# Patient Record
Sex: MALE | Race: WHITE
[De-identification: names, ages, dates, MRNs, and addresses within clinical notes are randomized per-mention and may not be internally consistent; named-entity substitution may affect disease eponyms.]

---

## 2021-04-12 DIAGNOSIS — Z78.9 OTHER SPECIFIED HEALTH STATUS: ICD-10-CM

## 2021-04-12 DIAGNOSIS — Z12.5 ENCOUNTER FOR SCREENING FOR MALIGNANT NEOPLASM OF PROSTATE: ICD-10-CM

## 2021-04-12 DIAGNOSIS — Z86.79 PERSONAL HISTORY OF OTHER DISEASES OF THE CIRCULATORY SYSTEM: ICD-10-CM

## 2021-04-12 DIAGNOSIS — Z82.49 FAMILY HISTORY OF ISCHEMIC HEART DISEASE AND OTHER DISEASES OF THE CIRCULATORY SYSTEM: ICD-10-CM

## 2021-04-12 DIAGNOSIS — Z79.01 LONG TERM (CURRENT) USE OF ANTICOAGULANTS: ICD-10-CM

## 2021-04-12 DIAGNOSIS — Z86.39 PERSONAL HISTORY OF OTHER ENDOCRINE, NUTRITIONAL AND METABOLIC DISEASE: ICD-10-CM

## 2021-04-12 RX ORDER — ASPIRIN 81 MG
81 TABLET, DELAYED RELEASE (ENTERIC COATED) ORAL
Refills: 0 | Status: COMPLETED | COMMUNITY

## 2021-04-12 RX ORDER — ASPIRIN ENTERIC COATED TABLETS 81 MG 81 MG/1
81 TABLET, DELAYED RELEASE ORAL
Refills: 0 | Status: ACTIVE | COMMUNITY

## 2021-04-29 ENCOUNTER — NON-APPOINTMENT (OUTPATIENT)
Age: 66
End: 2021-04-29

## 2021-04-29 ENCOUNTER — APPOINTMENT (OUTPATIENT)
Dept: HEART AND VASCULAR | Facility: CLINIC | Age: 66
End: 2021-04-29
Payer: COMMERCIAL

## 2021-04-29 VITALS
HEART RATE: 73 BPM | DIASTOLIC BLOOD PRESSURE: 74 MMHG | BODY MASS INDEX: 24.5 KG/M2 | HEIGHT: 71 IN | SYSTOLIC BLOOD PRESSURE: 114 MMHG | TEMPERATURE: 97.5 F | WEIGHT: 175 LBS

## 2021-04-29 PROCEDURE — 36415 COLL VENOUS BLD VENIPUNCTURE: CPT

## 2021-04-29 PROCEDURE — 99214 OFFICE O/P EST MOD 30 MIN: CPT | Mod: 25

## 2021-04-29 PROCEDURE — 99072 ADDL SUPL MATRL&STAF TM PHE: CPT

## 2021-04-29 PROCEDURE — 93000 ELECTROCARDIOGRAM COMPLETE: CPT

## 2021-04-29 NOTE — ASSESSMENT
[FreeTextEntry1] : Patient doing well will follow up in 3 months for echo to evaluate MR and stress testing to evaluate his arrhythmias as well as his CAD patient also has a borderline elevated PSA will check and make sure he is following up with urology

## 2021-04-29 NOTE — HISTORY OF PRESENT ILLNESS
[FreeTextEntry1] : Patient has a history of coronary artery disease with a stent placement patient has a history of atrial fibrillation status post ablation patient has a permanent pacemaker patient is doing well

## 2021-05-12 LAB
ALBUMIN SERPL ELPH-MCNC: 4.3 G/DL
ALP BLD-CCNC: 73 U/L
ALT SERPL-CCNC: 29 U/L
ANION GAP SERPL CALC-SCNC: 12 MMOL/L
AST SERPL-CCNC: 37 U/L
BASOPHILS # BLD AUTO: 0.02 K/UL
BASOPHILS NFR BLD AUTO: 0.4 %
BILIRUB SERPL-MCNC: 0.7 MG/DL
BUN SERPL-MCNC: 30 MG/DL
CALCIUM SERPL-MCNC: 9.3 MG/DL
CHLORIDE SERPL-SCNC: 105 MMOL/L
CHOLEST SERPL-MCNC: 159 MG/DL
CK SERPL-CCNC: 376 U/L
CO2 SERPL-SCNC: 24 MMOL/L
CREAT SERPL-MCNC: 1.25 MG/DL
EOSINOPHIL # BLD AUTO: 0.09 K/UL
EOSINOPHIL NFR BLD AUTO: 1.9 %
ESTIMATED AVERAGE GLUCOSE: 117 MG/DL
FERRITIN SERPL-MCNC: 194 NG/ML
GLUCOSE SERPL-MCNC: 92 MG/DL
HBA1C MFR BLD HPLC: 5.7 %
HCT VFR BLD CALC: 44.7 %
HDLC SERPL-MCNC: 75 MG/DL
HGB BLD-MCNC: 15.2 G/DL
IMM GRANULOCYTES NFR BLD AUTO: 0.2 %
LDLC SERPL CALC-MCNC: 69 MG/DL
LYMPHOCYTES # BLD AUTO: 1.4 K/UL
LYMPHOCYTES NFR BLD AUTO: 29.1 %
MAN DIFF?: NORMAL
MCHC RBC-ENTMCNC: 32.2 PG
MCHC RBC-ENTMCNC: 34 GM/DL
MCV RBC AUTO: 94.7 FL
MONOCYTES # BLD AUTO: 0.45 K/UL
MONOCYTES NFR BLD AUTO: 9.4 %
NEUTROPHILS # BLD AUTO: 2.84 K/UL
NEUTROPHILS NFR BLD AUTO: 59 %
NONHDLC SERPL-MCNC: 83 MG/DL
PLATELET # BLD AUTO: 190 K/UL
POTASSIUM SERPL-SCNC: 4.8 MMOL/L
PROT SERPL-MCNC: 6.6 G/DL
PSA SERPL-MCNC: 2.52 NG/ML
RBC # BLD: 4.72 M/UL
RBC # FLD: 13.1 %
SODIUM SERPL-SCNC: 141 MMOL/L
TRIGL SERPL-MCNC: 74 MG/DL
TSH SERPL-ACNC: 0.93 UIU/ML
VIT B12 SERPL-MCNC: 1455 PG/ML
WBC # FLD AUTO: 4.81 K/UL

## 2022-05-03 ENCOUNTER — APPOINTMENT (OUTPATIENT)
Dept: HEART AND VASCULAR | Facility: CLINIC | Age: 67
End: 2022-05-03
Payer: MEDICARE

## 2022-05-03 ENCOUNTER — NON-APPOINTMENT (OUTPATIENT)
Age: 67
End: 2022-05-03

## 2022-05-03 VITALS — SYSTOLIC BLOOD PRESSURE: 116 MMHG | DIASTOLIC BLOOD PRESSURE: 77 MMHG

## 2022-05-03 VITALS
OXYGEN SATURATION: 95 % | SYSTOLIC BLOOD PRESSURE: 143 MMHG | DIASTOLIC BLOOD PRESSURE: 88 MMHG | HEART RATE: 78 BPM | TEMPERATURE: 97.2 F | WEIGHT: 173 LBS | BODY MASS INDEX: 24.22 KG/M2 | HEIGHT: 71 IN

## 2022-05-03 PROCEDURE — 93000 ELECTROCARDIOGRAM COMPLETE: CPT

## 2022-05-03 PROCEDURE — 99214 OFFICE O/P EST MOD 30 MIN: CPT | Mod: 25

## 2022-05-03 NOTE — ASSESSMENT
[FreeTextEntry1] : Patient doing well will follow up with for echo to evaluate MR and stress testing to evaluate his arrhythmias as well as his CAD patient also has a borderline elevated PSA will check and make sure he is following up with urology

## 2022-05-03 NOTE — HISTORY OF PRESENT ILLNESS
[FreeTextEntry1] : Patient has a history of coronary artery disease with a stent placement patient has a history of atrial fibrillation status post ablation patient has a permanent pacemaker patient is doing well pt has stents in lad and rca july 19,2004 pt has moderate mr

## 2022-06-09 ENCOUNTER — APPOINTMENT (OUTPATIENT)
Dept: HEART AND VASCULAR | Facility: CLINIC | Age: 67
End: 2022-06-09
Payer: MEDICARE

## 2022-06-09 VITALS — HEART RATE: 75 BPM | DIASTOLIC BLOOD PRESSURE: 78 MMHG | SYSTOLIC BLOOD PRESSURE: 118 MMHG

## 2022-06-09 PROCEDURE — 93306 TTE W/DOPPLER COMPLETE: CPT

## 2022-06-09 PROCEDURE — 99212 OFFICE O/P EST SF 10 MIN: CPT | Mod: 25

## 2022-06-09 PROCEDURE — 93015 CV STRESS TEST SUPVJ I&R: CPT

## 2022-06-12 NOTE — REASON FOR VISIT
[Arrhythmia/ECG Abnorrmalities] : arrhythmia/ECG abnormalities [Hyperlipidemia] : hyperlipidemia [Hypertension] : hypertension [FreeTextEntry1] : pt takes toprol xl 50/25 am/pm pt s/p stents to lad and rca in 2004 s/p ablation for paf and ppm  2005 for 5 second pause s/p paf an d pvi 2007

## 2022-06-12 NOTE — REVIEW OF SYSTEMS
[Dyspnea on exertion] : dyspnea during exertion [Palpitations] : palpitations [Negative] : Integumentary

## 2022-06-12 NOTE — HISTORY OF PRESENT ILLNESS
[FreeTextEntry1] : Patient with history of stents to the LAD and RCA patient with history of paroxysmal A. fib status post multiple cardioversions and ablation patient with history of mitral regurgitation due to mitral valve prolapse and probably the etiology of his atrial fibrillation patient has been less active recently and has had some shortness of breath with exercise

## 2022-06-12 NOTE — ASSESSMENT
[FreeTextEntry1] : Patient underwent exercise stress testing patient does have mild ST depressions at peak exercise that may represent his ischemic heart disease but due to achieving greater than 10 METS we will continue medical therapy\par \par Patient's mitral regurgitation is moderate and also may be contributing to shortness of breath at peak exercise we will continue medical management

## 2022-12-15 ENCOUNTER — APPOINTMENT (OUTPATIENT)
Dept: HEART AND VASCULAR | Facility: CLINIC | Age: 67
End: 2022-12-15

## 2023-02-23 ENCOUNTER — NON-APPOINTMENT (OUTPATIENT)
Age: 68
End: 2023-02-23

## 2023-02-23 ENCOUNTER — APPOINTMENT (OUTPATIENT)
Dept: HEART AND VASCULAR | Facility: CLINIC | Age: 68
End: 2023-02-23
Payer: MEDICARE

## 2023-02-23 VITALS
HEART RATE: 77 BPM | OXYGEN SATURATION: 98 % | DIASTOLIC BLOOD PRESSURE: 78 MMHG | SYSTOLIC BLOOD PRESSURE: 118 MMHG | WEIGHT: 177 LBS | BODY MASS INDEX: 24.78 KG/M2 | HEIGHT: 71 IN

## 2023-02-23 PROCEDURE — 93000 ELECTROCARDIOGRAM COMPLETE: CPT

## 2023-02-23 PROCEDURE — 99214 OFFICE O/P EST MOD 30 MIN: CPT

## 2023-02-23 NOTE — ASSESSMENT
[FreeTextEntry1] : pt with cad s/p stents schedule ett\par labile hypertension increased metoprolol succinate 50mg po bid\par paf in nsr s/p ablation has ppm\par elevated psa 4.4 seeing urology\par colonscopy 2023\par pt followed by internist

## 2023-02-23 NOTE — REASON FOR VISIT
[Arrhythmia/ECG Abnorrmalities] : arrhythmia/ECG abnormalities [Hyperlipidemia] : hyperlipidemia [Hypertension] : hypertension [FreeTextEntry1] : pt takes toprol xl 50/25 am/pm pt s/p stents to lad and rca in 2004 s/p ablation for paf and ppm  2005 for 5 second pause s/p paf an d pvi 2007 pt with labille hypertension 165 and 117

## 2023-03-07 ENCOUNTER — APPOINTMENT (OUTPATIENT)
Dept: HEART AND VASCULAR | Facility: CLINIC | Age: 68
End: 2023-03-07
Payer: MEDICARE

## 2023-03-07 PROCEDURE — 99214 OFFICE O/P EST MOD 30 MIN: CPT | Mod: 25

## 2023-03-07 PROCEDURE — 93015 CV STRESS TEST SUPVJ I&R: CPT

## 2023-03-07 NOTE — REASON FOR VISIT
[Arrhythmia/ECG Abnorrmalities] : arrhythmia/ECG abnormalities [Hyperlipidemia] : hyperlipidemia [Hypertension] : hypertension [FreeTextEntry1] : Pt. is a 68 year old M with PMHx of Afib with RVR (s/p ablation), mitral valve prolapse and CAD (s/p PCI to LAD and RCA 19 years ago ) who presents today for follow-up and stress EKG.

## 2023-03-07 NOTE — HISTORY OF PRESENT ILLNESS
[FreeTextEntry1] : Pt. was last seen 2023. At that time his Metoprolol was increased to 50 mg BID due to labile HTN. \par \par Since then: \par \par HLD: On atorvastatin 40 mg daily. Last LDL 91 above goal LDL < 70 \par \par HTN: On Metoprolol succinate 50 mg BID\par \par CAD: on ASA 81 mg daily \par \par ========================\par Labs/Diagnostics: \par Lipid profile (2023): TC: 177, HDL: 66, T, LDL: 91\par \par Echo (2022): normal LVSF with EF 65%, mild MR\par \par Stress EKG (2022): Equivocal stress test; exaggerated BP response to exercise; pt. exercised according to the CONNER protocol for 12:01 minutes, achieving 13.70 METS

## 2023-03-07 NOTE — ASSESSMENT
[FreeTextEntry1] : Pt. is a 68 year old M with PMHx of Afib with RVR (s/p ablation), mitral valve prolapse and CAD (s/p PCI to LAD and RCA) who presents today for follow-up and stress EKG.\par Patient exercised for 11 minutes upsloping EKG changes no chest pain no shortness of breath continue medical management patient had stents to the LAD and RCA and 19 years ago\par pt had mild hypertensive response pt on metopolol er 50mg bid

## 2023-05-03 ENCOUNTER — RX RENEWAL (OUTPATIENT)
Age: 68
End: 2023-05-03

## 2023-06-03 ENCOUNTER — RX RENEWAL (OUTPATIENT)
Age: 68
End: 2023-06-03

## 2023-08-10 ENCOUNTER — RX RENEWAL (OUTPATIENT)
Age: 68
End: 2023-08-10

## 2023-09-14 ENCOUNTER — APPOINTMENT (OUTPATIENT)
Dept: HEART AND VASCULAR | Facility: CLINIC | Age: 68
End: 2023-09-14
Payer: MEDICARE

## 2023-09-14 VITALS
HEIGHT: 71 IN | SYSTOLIC BLOOD PRESSURE: 130 MMHG | WEIGHT: 169 LBS | BODY MASS INDEX: 23.66 KG/M2 | HEART RATE: 83 BPM | DIASTOLIC BLOOD PRESSURE: 82 MMHG

## 2023-09-14 DIAGNOSIS — I34.1 NONRHEUMATIC MITRAL (VALVE) PROLAPSE: ICD-10-CM

## 2023-09-14 DIAGNOSIS — Z00.00 ENCOUNTER FOR GENERAL ADULT MEDICAL EXAMINATION W/OUT ABNORMAL FINDINGS: ICD-10-CM

## 2023-09-14 DIAGNOSIS — I10 ESSENTIAL (PRIMARY) HYPERTENSION: ICD-10-CM

## 2023-09-14 DIAGNOSIS — I25.10 ATHEROSCLEROTIC HEART DISEASE OF NATIVE CORONARY ARTERY W/OUT ANGINA PECTORIS: ICD-10-CM

## 2023-09-14 PROCEDURE — 36415 COLL VENOUS BLD VENIPUNCTURE: CPT

## 2023-09-14 PROCEDURE — 99214 OFFICE O/P EST MOD 30 MIN: CPT

## 2023-09-14 PROCEDURE — 93000 ELECTROCARDIOGRAM COMPLETE: CPT

## 2023-09-14 RX ORDER — METOPROLOL SUCCINATE 50 MG/1
50 TABLET, EXTENDED RELEASE ORAL TWICE DAILY
Qty: 180 | Refills: 0 | Status: DISCONTINUED | COMMUNITY
Start: 2023-03-07 | End: 2023-09-14

## 2023-09-15 LAB
ALBUMIN SERPL ELPH-MCNC: 4.5 G/DL
ALP BLD-CCNC: 86 U/L
ALT SERPL-CCNC: 19 U/L
ANION GAP SERPL CALC-SCNC: 12 MMOL/L
AST SERPL-CCNC: 22 U/L
BILIRUB SERPL-MCNC: 0.7 MG/DL
BUN SERPL-MCNC: 27 MG/DL
CALCIUM SERPL-MCNC: 9.4 MG/DL
CHLORIDE SERPL-SCNC: 103 MMOL/L
CHOLEST SERPL-MCNC: 182 MG/DL
CK SERPL-CCNC: 126 U/L
CO2 SERPL-SCNC: 23 MMOL/L
CREAT SERPL-MCNC: 1.13 MG/DL
EGFR: 70 ML/MIN/1.73M2
ESTIMATED AVERAGE GLUCOSE: 117 MG/DL
FERRITIN SERPL-MCNC: 152 NG/ML
GLUCOSE SERPL-MCNC: 101 MG/DL
HBA1C MFR BLD HPLC: 5.7 %
HDLC SERPL-MCNC: 67 MG/DL
LDLC SERPL CALC-MCNC: 97 MG/DL
NONHDLC SERPL-MCNC: 115 MG/DL
POTASSIUM SERPL-SCNC: 4.5 MMOL/L
PROT SERPL-MCNC: 6.7 G/DL
SODIUM SERPL-SCNC: 139 MMOL/L
TRIGL SERPL-MCNC: 102 MG/DL
TSH SERPL-ACNC: 1.09 UIU/ML
VIT B12 SERPL-MCNC: 1420 PG/ML

## 2023-11-14 ENCOUNTER — RX RENEWAL (OUTPATIENT)
Age: 68
End: 2023-11-14

## 2023-11-14 RX ORDER — METOPROLOL SUCCINATE 25 MG/1
25 TABLET, EXTENDED RELEASE ORAL
Qty: 90 | Refills: 1 | Status: ACTIVE | COMMUNITY
Start: 1900-01-01 | End: 1900-01-01

## 2023-12-08 ENCOUNTER — RX RENEWAL (OUTPATIENT)
Age: 68
End: 2023-12-08

## 2024-03-07 ENCOUNTER — RX RENEWAL (OUTPATIENT)
Age: 69
End: 2024-03-07

## 2024-03-14 ENCOUNTER — RX RENEWAL (OUTPATIENT)
Age: 69
End: 2024-03-14

## 2024-03-14 RX ORDER — ATORVASTATIN CALCIUM 80 MG/1
80 TABLET, FILM COATED ORAL
Qty: 90 | Refills: 1 | Status: ACTIVE | COMMUNITY
Start: 2023-05-03 | End: 1900-01-01

## 2024-04-25 ENCOUNTER — APPOINTMENT (OUTPATIENT)
Dept: HEART AND VASCULAR | Facility: CLINIC | Age: 69
End: 2024-04-25
Payer: MEDICARE

## 2024-04-25 VITALS
OXYGEN SATURATION: 95 % | HEART RATE: 80 BPM | BODY MASS INDEX: 24.22 KG/M2 | DIASTOLIC BLOOD PRESSURE: 78 MMHG | WEIGHT: 173 LBS | SYSTOLIC BLOOD PRESSURE: 122 MMHG | HEIGHT: 71 IN | TEMPERATURE: 93.4 F

## 2024-04-25 VITALS — SYSTOLIC BLOOD PRESSURE: 108 MMHG | DIASTOLIC BLOOD PRESSURE: 76 MMHG | HEART RATE: 71 BPM

## 2024-04-25 PROCEDURE — 93015 CV STRESS TEST SUPVJ I&R: CPT

## 2024-04-25 PROCEDURE — ZZZZZ: CPT

## 2024-04-25 PROCEDURE — 93306 TTE W/DOPPLER COMPLETE: CPT

## 2024-04-25 PROCEDURE — 36415 COLL VENOUS BLD VENIPUNCTURE: CPT

## 2024-04-25 PROCEDURE — 99214 OFFICE O/P EST MOD 30 MIN: CPT | Mod: 25

## 2024-04-25 PROCEDURE — ZZZZZ: CPT | Mod: NC

## 2024-04-25 NOTE — HISTORY OF PRESENT ILLNESS
[FreeTextEntry1] : Since his last visit, Pt is doing well  [Prior Hx 2023: He is wondering if we can decrease his Metoprolol so he can feel an increased HR response to exercise. His is currently taking Metoprolol succinate 50 mg BID.   He is not regularly monitoring his BP at home.   Patient remains active walking about 10,000-15,000 steps/day. He also runs maybe twice/week. No exertional chest pain or shortness of breath.  Pt. also denies any orthopnea, PND, palpitations, lightheadedness, syncope or lower extremity edema.   He lost 10 lbs. in the last 6 months by limiting carbs and intermittent fasting.   EP: Dr. Mohr PCP: Cyn Mcgovern (last visit 23)]  ==================================== Labs/Diagnostics:  2023 A1c: 5.7% Lipid profile: T, TC: 182, HDL: 67, LDL: 97 K/Creat: 4.5/1.13  Stress EKG (2023): pt. exercised according to the CONNER protocol for 11:04 minutes, achieving 13.40 METS; no ischemic ST changes on EKG.   Echo (2022): normal mitral valve, normal LVSF with EF 65%, normal RV size and function

## 2024-04-25 NOTE — ASSESSMENT
[FreeTextEntry1] : Pt. is a 69-year-old M with PMHx of Afib with RVR (s/p ablation), PPM placement (due to pause), mitral valve prolapse and CAD (s/p PCI to LAD and RCA 19 years ago) who presents today for follow-up. paf s/p ablations innsr ppm for pauses cad negative ett for ischemia excellent ex tolerance

## 2024-04-25 NOTE — REASON FOR VISIT
[Arrhythmia/ECG Abnorrmalities] : arrhythmia/ECG abnormalities [Structural Heart and Valve Disease] : structural heart and valve disease [Coronary Artery Disease] : coronary artery disease [FreeTextEntry1] : Pt. is a 69-year-old M with PMHx of Afib with RVR (s/p ablation), PPM placement (due to pause), mitral valve prolapse and CAD (s/p PCI to LAD and RCA 19 years ago) who presents today for follow-up.

## 2024-05-30 ENCOUNTER — RX RENEWAL (OUTPATIENT)
Age: 69
End: 2024-05-30

## 2024-05-30 RX ORDER — LOSARTAN POTASSIUM 25 MG/1
25 TABLET, FILM COATED ORAL
Qty: 90 | Refills: 0 | Status: ACTIVE | COMMUNITY
Start: 2023-09-14 | End: 1900-01-01

## 2024-06-12 ENCOUNTER — APPOINTMENT (OUTPATIENT)
Dept: HEART AND VASCULAR | Facility: CLINIC | Age: 69
End: 2024-06-12
Payer: MEDICARE

## 2024-06-12 VITALS
WEIGHT: 173 LBS | HEART RATE: 75 BPM | SYSTOLIC BLOOD PRESSURE: 103 MMHG | DIASTOLIC BLOOD PRESSURE: 76 MMHG | BODY MASS INDEX: 24.22 KG/M2 | OXYGEN SATURATION: 95 % | TEMPERATURE: 95.9 F | HEIGHT: 71 IN

## 2024-06-12 DIAGNOSIS — I48.91 UNSPECIFIED ATRIAL FIBRILLATION: ICD-10-CM

## 2024-06-12 DIAGNOSIS — Z95.0 PRESENCE OF CARDIAC PACEMAKER: ICD-10-CM

## 2024-06-12 PROCEDURE — 99213 OFFICE O/P EST LOW 20 MIN: CPT | Mod: 25

## 2024-06-12 PROCEDURE — 93280 PM DEVICE PROGR EVAL DUAL: CPT

## 2024-06-25 PROBLEM — I48.91 ATRIAL FIBRILLATION WITH RVR: Status: ACTIVE | Noted: 2021-04-29

## 2024-06-25 PROBLEM — Z95.0 ARTIFICIAL CARDIAC PACEMAKER: Status: ACTIVE | Noted: 2024-06-25

## 2024-06-25 NOTE — REASON FOR VISIT
[Symptom and Test Evaluation] : symptom and test evaluation [Arrhythmia/ECG Abnorrmalities] : arrhythmia/ECG abnormalities [FreeTextEntry1] : Pt. is a 69-year-old M with PMHx of Afib with RVR (s/p ablation), PPM placement (due to pause), mitral valve prolapse and CAD (s/p PCI to LAD and RCA 19 years ago) who presents today for follow-up. [Follow-up Device Check] : is here today for a follow-up device check visit for [New Patient Device Check] : is here today for a new patient device check visit for [Other ___] : [unfilled]

## 2024-06-26 NOTE — PROCEDURE
[No] : not [NSR] : normal sinus rhythm [Pacemaker] : pacemaker [Medtronic] : Medtronic [DDDR] : DDDR [Threshold Testing Performed] : Threshold testing was performed [Lead Imp:  ___ohms] : lead impedance was [unfilled] ohms [Sensing Amplitude ___mv] : sensing amplitude was [unfilled] mv [___V @] : [unfilled] V [___ ms] : [unfilled] ms [Counters Reset] : the counters were reset [Longevity: ___ months] : The estimated remaining battery life is [unfilled] months [de-identified] : Underlying SR 60s [de-identified] : [place]ett [de-identified] :  [de-identified] :  13 months [de-identified] : AP 85%  0% No AF

## 2024-06-26 NOTE — END OF VISIT
[Time Spent: ___ minutes] : I have spent [unfilled] minutes of time on the encounter. [FreeTextEntry3] : I, Cyndy Singh, am scribing for (in the presence of) Dr. Lopez the following sections: HPI, PMH,Family/social history, ROS, Physical Exam, Assessment / Plan.   I, Mansoor Lopez, personally performed the services described in the documentation, reviewed the documentation recorded by the scribe in my presence and it accurately and completely records my words and actions.

## 2024-06-26 NOTE — HISTORY OF PRESENT ILLNESS
[de-identified] : Pt. is a 69-year-old M with PMHx of Afib with RVR (s/p 2 ablations, most recenlty 15 years ago), PPM placement (due to pause), mitral valve prolapse and CAD (s/p PCI to LAD and RCA 19 years ago) who presents today to establish care as his EP recently retired.   He offers no device related complaints. He is considering where to transfer his EP care and would not like to transfer remote monitoring at this time.

## 2024-06-26 NOTE — HISTORY OF PRESENT ILLNESS
[de-identified] : Pt. is a 69-year-old M with PMHx of Afib with RVR (s/p 2 ablations, most recenlty 15 years ago), PPM placement (due to pause), mitral valve prolapse and CAD (s/p PCI to LAD and RCA 19 years ago) who presents today to establish care as his EP recently retired.   He offers no device related complaints. He is considering where to transfer his EP care and would not like to transfer remote monitoring at this time.

## 2024-06-26 NOTE — PROCEDURE
[No] : not [NSR] : normal sinus rhythm [Pacemaker] : pacemaker [Medtronic] : Medtronic [DDDR] : DDDR [Threshold Testing Performed] : Threshold testing was performed [Lead Imp:  ___ohms] : lead impedance was [unfilled] ohms [Sensing Amplitude ___mv] : sensing amplitude was [unfilled] mv [___V @] : [unfilled] V [___ ms] : [unfilled] ms [Counters Reset] : the counters were reset [Longevity: ___ months] : The estimated remaining battery life is [unfilled] months [de-identified] : Underlying SR 60s [de-identified] : [place]ett [de-identified] :  [de-identified] :  13 months [de-identified] : AP 85%  0% No AF

## 2024-09-03 ENCOUNTER — RX RENEWAL (OUTPATIENT)
Age: 69
End: 2024-09-03

## 2024-09-13 ENCOUNTER — NON-APPOINTMENT (OUTPATIENT)
Age: 69
End: 2024-09-13

## 2024-09-13 ENCOUNTER — APPOINTMENT (OUTPATIENT)
Dept: HEART AND VASCULAR | Facility: CLINIC | Age: 69
End: 2024-09-13
Payer: MEDICARE

## 2024-09-13 PROCEDURE — 93296 REM INTERROG EVL PM/IDS: CPT

## 2024-09-13 PROCEDURE — 93294 REM INTERROG EVL PM/LDLS PM: CPT

## 2024-09-19 ENCOUNTER — RX RENEWAL (OUTPATIENT)
Age: 69
End: 2024-09-19

## 2024-10-23 ENCOUNTER — APPOINTMENT (OUTPATIENT)
Dept: HEART AND VASCULAR | Facility: CLINIC | Age: 69
End: 2024-10-23
Payer: MEDICARE

## 2024-10-23 VITALS
TEMPERATURE: 97.1 F | BODY MASS INDEX: 23.8 KG/M2 | SYSTOLIC BLOOD PRESSURE: 113 MMHG | HEART RATE: 76 BPM | DIASTOLIC BLOOD PRESSURE: 78 MMHG | OXYGEN SATURATION: 98 % | WEIGHT: 170 LBS | HEIGHT: 71 IN

## 2024-10-23 PROCEDURE — 99214 OFFICE O/P EST MOD 30 MIN: CPT

## 2024-10-23 PROCEDURE — 93000 ELECTROCARDIOGRAM COMPLETE: CPT

## 2024-10-23 PROCEDURE — G2211 COMPLEX E/M VISIT ADD ON: CPT

## 2024-12-02 ENCOUNTER — RX RENEWAL (OUTPATIENT)
Age: 69
End: 2024-12-02

## 2024-12-13 ENCOUNTER — APPOINTMENT (OUTPATIENT)
Dept: HEART AND VASCULAR | Facility: CLINIC | Age: 69
End: 2024-12-13

## 2024-12-13 PROCEDURE — 93296 REM INTERROG EVL PM/IDS: CPT

## 2024-12-13 PROCEDURE — 93294 REM INTERROG EVL PM/LDLS PM: CPT

## 2025-01-14 ENCOUNTER — NON-APPOINTMENT (OUTPATIENT)
Age: 70
End: 2025-01-14

## 2025-01-14 ENCOUNTER — APPOINTMENT (OUTPATIENT)
Dept: HEART AND VASCULAR | Facility: CLINIC | Age: 70
End: 2025-01-14

## 2025-01-14 VITALS
WEIGHT: 173 LBS | TEMPERATURE: 97.3 F | BODY MASS INDEX: 24.22 KG/M2 | DIASTOLIC BLOOD PRESSURE: 66 MMHG | HEART RATE: 78 BPM | HEIGHT: 71 IN | SYSTOLIC BLOOD PRESSURE: 107 MMHG

## 2025-01-14 PROCEDURE — 93280 PM DEVICE PROGR EVAL DUAL: CPT

## 2025-01-28 ENCOUNTER — RX RENEWAL (OUTPATIENT)
Age: 70
End: 2025-01-28

## 2025-03-07 ENCOUNTER — RX RENEWAL (OUTPATIENT)
Age: 70
End: 2025-03-07

## 2025-03-24 ENCOUNTER — OUTPATIENT (OUTPATIENT)
Dept: OUTPATIENT SERVICES | Facility: HOSPITAL | Age: 70
LOS: 1 days | Discharge: HOME | End: 2025-03-24
Payer: MEDICARE

## 2025-03-24 VITALS
SYSTOLIC BLOOD PRESSURE: 122 MMHG | DIASTOLIC BLOOD PRESSURE: 75 MMHG | HEART RATE: 65 BPM | HEIGHT: 71 IN | TEMPERATURE: 98 F | OXYGEN SATURATION: 99 % | WEIGHT: 175.05 LBS | RESPIRATION RATE: 16 BRPM

## 2025-03-24 DIAGNOSIS — Z98.890 OTHER SPECIFIED POSTPROCEDURAL STATES: Chronic | ICD-10-CM

## 2025-03-24 DIAGNOSIS — Z95.0 PRESENCE OF CARDIAC PACEMAKER: Chronic | ICD-10-CM

## 2025-03-24 DIAGNOSIS — Z90.49 ACQUIRED ABSENCE OF OTHER SPECIFIED PARTS OF DIGESTIVE TRACT: Chronic | ICD-10-CM

## 2025-03-24 PROCEDURE — 85014 HEMATOCRIT: CPT

## 2025-03-24 PROCEDURE — C1785: CPT

## 2025-03-24 PROCEDURE — 85610 PROTHROMBIN TIME: CPT

## 2025-03-24 PROCEDURE — 82803 BLOOD GASES ANY COMBINATION: CPT

## 2025-03-24 PROCEDURE — 33228 REMV&REPLC PM GEN DUAL LEAD: CPT

## 2025-03-24 PROCEDURE — 84132 ASSAY OF SERUM POTASSIUM: CPT

## 2025-03-24 PROCEDURE — 82947 ASSAY GLUCOSE BLOOD QUANT: CPT

## 2025-03-24 PROCEDURE — 82330 ASSAY OF CALCIUM: CPT

## 2025-03-24 PROCEDURE — C1889: CPT

## 2025-03-24 PROCEDURE — 82565 ASSAY OF CREATININE: CPT

## 2025-03-24 PROCEDURE — 84295 ASSAY OF SERUM SODIUM: CPT

## 2025-03-24 RX ORDER — CEFAZOLIN SODIUM IN 0.9 % NACL 3 G/100 ML
2000 INTRAVENOUS SOLUTION, PIGGYBACK (ML) INTRAVENOUS ONCE
Refills: 0 | Status: DISCONTINUED | OUTPATIENT
Start: 2025-03-24 | End: 2025-03-24

## 2025-03-24 NOTE — PROGRESS NOTE ADULT - SUBJECTIVE AND OBJECTIVE BOX
Brief Electrophysiology Procedure Note:  69 yo male with a Hx of AF s/p ablation x 2 (most recently 15 years ago), SND s/p DC-PPM (Medtronic), CAD s/p PCI to LAD and RCA (20 years ago), mitral valve prolapse found to be at JUJU on most recent device check, now s/p pulse generator change.    Brief Procedural Details:  Left subclavicular region was anesthetized with lidocaine and sensorcaine. Incision was made just inferiorly to prior incision site. The incision was carried down to the level of the capsule, and the capsule was opened and the pulse generator was removed. The pocket was then irrigated with normal saline. The new pulse generator was brought onto the field. The leads were connected to the new pulse generator and interrogated with adequate pacing and sensing thresholds. The device was then placed in an antimicrobial pouch and placed into the pocket. The pocket was then closed with 2-0 Vicryl for 2 layers, and 3-0 Vicryl as a subcuticular layer and the skin was closed with Dermabond.

## 2025-03-24 NOTE — PRE-ANESTHESIA EVALUATION ADULT - NSANTHPEFT_GEN_ALL_CORE
A&Ox3  neuro:  neck:  lung:  cor:  extr: A&Ox3  neuro: nonfocal  neck: no JVD  lung: CTA  cor: RRR S1 S2  extr: no c,c,e. 20G PIV in situ left AC

## 2025-03-24 NOTE — PRE-ANESTHESIA EVALUATION ADULT - NSANTHHOMEMEDSFT_GEN_ALL_CORE
ASA 81 mg  Atorvastatin  Losartan  Metoprolol ASA 81 mg  Atorvastatin  Losartan  Metoprolol (did not take today)  Metoprolol ASA 81 mg  Atorvastatin  Losartan  Metoprolol (did not take any meds today)

## 2025-03-24 NOTE — PRE-ANESTHESIA EVALUATION ADULT - NSANTHPMHFT_GEN_ALL_CORE
h/o AF, s/p ablation x 2, PPM for bradycardia. Now genera h/o AF, s/p ablation x 2, PPM for bradycardia. Now generator JUJU

## 2025-03-24 NOTE — PROGRESS NOTE ADULT - ASSESSMENT
69 yo male with a Hx of AF s/p ablation x 2 (most recently 15 years ago), SND s/p DC-PPM (Medtronic), CAD s/p PCI to LAD and RCA (20 years ago), mitral valve prolapse found to be at JUJU on most recent device check, now s/p pulse generator change.    -Post procedural ECG  -Resume home medications  -Patient to be discharged today

## 2025-03-31 ENCOUNTER — RX RENEWAL (OUTPATIENT)
Age: 70
End: 2025-03-31

## 2025-03-31 PROBLEM — I48.91 UNSPECIFIED ATRIAL FIBRILLATION: Chronic | Status: ACTIVE | Noted: 2025-03-24

## 2025-03-31 PROBLEM — Z95.5 PRESENCE OF CORONARY ANGIOPLASTY IMPLANT AND GRAFT: Chronic | Status: ACTIVE | Noted: 2025-03-24

## 2025-03-31 PROBLEM — I25.10 ATHEROSCLEROTIC HEART DISEASE OF NATIVE CORONARY ARTERY WITHOUT ANGINA PECTORIS: Chronic | Status: ACTIVE | Noted: 2025-03-24

## 2025-03-31 PROBLEM — I34.1 NONRHEUMATIC MITRAL (VALVE) PROLAPSE: Chronic | Status: ACTIVE | Noted: 2025-03-24

## 2025-04-04 ENCOUNTER — RX RENEWAL (OUTPATIENT)
Age: 70
End: 2025-04-04

## 2025-04-10 ENCOUNTER — APPOINTMENT (OUTPATIENT)
Dept: HEART AND VASCULAR | Facility: CLINIC | Age: 70
End: 2025-04-10
Payer: MEDICARE

## 2025-04-10 ENCOUNTER — NON-APPOINTMENT (OUTPATIENT)
Age: 70
End: 2025-04-10

## 2025-04-10 VITALS
HEIGHT: 71 IN | TEMPERATURE: 98.2 F | OXYGEN SATURATION: 96 % | SYSTOLIC BLOOD PRESSURE: 119 MMHG | DIASTOLIC BLOOD PRESSURE: 67 MMHG | WEIGHT: 175 LBS | HEART RATE: 74 BPM | BODY MASS INDEX: 24.5 KG/M2

## 2025-04-10 DIAGNOSIS — Z95.0 PRESENCE OF CARDIAC PACEMAKER: ICD-10-CM

## 2025-04-10 PROCEDURE — 93280 PM DEVICE PROGR EVAL DUAL: CPT

## 2025-04-24 ENCOUNTER — APPOINTMENT (OUTPATIENT)
Dept: HEART AND VASCULAR | Facility: CLINIC | Age: 70
End: 2025-04-24
Payer: MEDICARE

## 2025-04-24 VITALS
OXYGEN SATURATION: 96 % | HEIGHT: 71 IN | HEART RATE: 73 BPM | SYSTOLIC BLOOD PRESSURE: 102 MMHG | BODY MASS INDEX: 24.36 KG/M2 | DIASTOLIC BLOOD PRESSURE: 62 MMHG | WEIGHT: 174 LBS | TEMPERATURE: 97.8 F

## 2025-04-24 DIAGNOSIS — I25.10 ATHEROSCLEROTIC HEART DISEASE OF NATIVE CORONARY ARTERY W/OUT ANGINA PECTORIS: ICD-10-CM

## 2025-04-24 DIAGNOSIS — Z95.0 PRESENCE OF CARDIAC PACEMAKER: ICD-10-CM

## 2025-04-24 DIAGNOSIS — I10 ESSENTIAL (PRIMARY) HYPERTENSION: ICD-10-CM

## 2025-04-24 DIAGNOSIS — I48.91 UNSPECIFIED ATRIAL FIBRILLATION: ICD-10-CM

## 2025-04-24 PROCEDURE — 99214 OFFICE O/P EST MOD 30 MIN: CPT | Mod: 24

## 2025-04-24 PROCEDURE — G2211 COMPLEX E/M VISIT ADD ON: CPT

## 2025-04-24 PROCEDURE — 93000 ELECTROCARDIOGRAM COMPLETE: CPT

## 2025-04-24 PROCEDURE — 36415 COLL VENOUS BLD VENIPUNCTURE: CPT

## 2025-04-27 LAB
ALBUMIN SERPL ELPH-MCNC: 4.4 G/DL
ALP BLD-CCNC: 79 U/L
ALT SERPL-CCNC: 28 U/L
ANION GAP SERPL CALC-SCNC: 13 MMOL/L
AST SERPL-CCNC: 30 U/L
BASOPHILS # BLD AUTO: 0.03 K/UL
BASOPHILS NFR BLD AUTO: 0.5 %
BILIRUB SERPL-MCNC: 0.8 MG/DL
BUN SERPL-MCNC: 28 MG/DL
CALCIUM SERPL-MCNC: 9.5 MG/DL
CHLORIDE SERPL-SCNC: 104 MMOL/L
CHOLEST SERPL-MCNC: 144 MG/DL
CK SERPL-CCNC: 253 U/L
CO2 SERPL-SCNC: 22 MMOL/L
CREAT SERPL-MCNC: 1.04 MG/DL
EGFRCR SERPLBLD CKD-EPI 2021: 77 ML/MIN/1.73M2
EOSINOPHIL # BLD AUTO: 0.14 K/UL
EOSINOPHIL NFR BLD AUTO: 2.5 %
ESTIMATED AVERAGE GLUCOSE: 126 MG/DL
FERRITIN SERPL-MCNC: 151 NG/ML
GLUCOSE SERPL-MCNC: 97 MG/DL
HBA1C MFR BLD HPLC: 6 %
HCT VFR BLD CALC: 44.2 %
HDLC SERPL-MCNC: 59 MG/DL
HGB BLD-MCNC: 15 G/DL
IMM GRANULOCYTES NFR BLD AUTO: 0.2 %
LDLC SERPL-MCNC: 68 MG/DL
LYMPHOCYTES # BLD AUTO: 1.7 K/UL
LYMPHOCYTES NFR BLD AUTO: 30.3 %
MAN DIFF?: NORMAL
MCHC RBC-ENTMCNC: 31.8 PG
MCHC RBC-ENTMCNC: 33.9 G/DL
MCV RBC AUTO: 93.6 FL
MONOCYTES # BLD AUTO: 0.59 K/UL
MONOCYTES NFR BLD AUTO: 10.5 %
NEUTROPHILS # BLD AUTO: 3.14 K/UL
NEUTROPHILS NFR BLD AUTO: 56 %
NONHDLC SERPL-MCNC: 85 MG/DL
PLATELET # BLD AUTO: 186 K/UL
POTASSIUM SERPL-SCNC: 4.5 MMOL/L
PROT SERPL-MCNC: 6.8 G/DL
PSA SERPL-MCNC: 6.3 NG/ML
RBC # BLD: 4.72 M/UL
RBC # FLD: 14 %
SODIUM SERPL-SCNC: 139 MMOL/L
TRIGL SERPL-MCNC: 88 MG/DL
TSH SERPL-ACNC: 0.91 UIU/ML
VIT B12 SERPL-MCNC: 1179 PG/ML
WBC # FLD AUTO: 5.61 K/UL

## 2025-05-16 ENCOUNTER — RX RENEWAL (OUTPATIENT)
Age: 70
End: 2025-05-16

## 2025-05-22 ENCOUNTER — APPOINTMENT (OUTPATIENT)
Dept: HEART AND VASCULAR | Facility: CLINIC | Age: 70
End: 2025-05-22

## 2025-05-22 ENCOUNTER — APPOINTMENT (OUTPATIENT)
Dept: HEART AND VASCULAR | Facility: CLINIC | Age: 70
End: 2025-05-22
Payer: MEDICARE

## 2025-05-22 VITALS
DIASTOLIC BLOOD PRESSURE: 70 MMHG | SYSTOLIC BLOOD PRESSURE: 110 MMHG | BODY MASS INDEX: 24.22 KG/M2 | HEART RATE: 79 BPM | WEIGHT: 173 LBS | HEIGHT: 71 IN

## 2025-05-22 DIAGNOSIS — I34.1 NONRHEUMATIC MITRAL (VALVE) PROLAPSE: ICD-10-CM

## 2025-05-22 DIAGNOSIS — I48.91 UNSPECIFIED ATRIAL FIBRILLATION: ICD-10-CM

## 2025-05-22 DIAGNOSIS — I25.10 ATHEROSCLEROTIC HEART DISEASE OF NATIVE CORONARY ARTERY W/OUT ANGINA PECTORIS: ICD-10-CM

## 2025-05-22 DIAGNOSIS — Z95.0 PRESENCE OF CARDIAC PACEMAKER: ICD-10-CM

## 2025-05-22 PROCEDURE — 93306 TTE W/DOPPLER COMPLETE: CPT

## 2025-05-22 PROCEDURE — 93015 CV STRESS TEST SUPVJ I&R: CPT

## 2025-05-22 PROCEDURE — 99214 OFFICE O/P EST MOD 30 MIN: CPT | Mod: 25

## 2025-06-16 ENCOUNTER — RX RENEWAL (OUTPATIENT)
Age: 70
End: 2025-06-16

## 2025-07-15 ENCOUNTER — APPOINTMENT (OUTPATIENT)
Dept: HEART AND VASCULAR | Facility: CLINIC | Age: 70
End: 2025-07-15
Payer: MEDICARE

## 2025-07-15 ENCOUNTER — NON-APPOINTMENT (OUTPATIENT)
Age: 70
End: 2025-07-15

## 2025-07-15 VITALS
TEMPERATURE: 97.3 F | BODY MASS INDEX: 24.22 KG/M2 | HEART RATE: 71 BPM | DIASTOLIC BLOOD PRESSURE: 73 MMHG | HEIGHT: 71 IN | SYSTOLIC BLOOD PRESSURE: 119 MMHG | WEIGHT: 173 LBS

## 2025-07-15 PROCEDURE — 93280 PM DEVICE PROGR EVAL DUAL: CPT

## 2025-07-25 ENCOUNTER — RX RENEWAL (OUTPATIENT)
Age: 70
End: 2025-07-25